# Patient Record
Sex: MALE | Race: WHITE
[De-identification: names, ages, dates, MRNs, and addresses within clinical notes are randomized per-mention and may not be internally consistent; named-entity substitution may affect disease eponyms.]

---

## 2019-07-14 ENCOUNTER — HOSPITAL ENCOUNTER (EMERGENCY)
Dept: HOSPITAL 95 - ER | Age: 84
Discharge: HOME | End: 2019-07-14
Payer: MEDICARE

## 2019-07-14 VITALS — BODY MASS INDEX: 33.9 KG/M2 | WEIGHT: 216.01 LBS | HEIGHT: 67 IN

## 2019-07-14 DIAGNOSIS — Z88.5: ICD-10-CM

## 2019-07-14 DIAGNOSIS — K21.9: ICD-10-CM

## 2019-07-14 DIAGNOSIS — Z79.899: ICD-10-CM

## 2019-07-14 DIAGNOSIS — Z88.8: ICD-10-CM

## 2019-07-14 DIAGNOSIS — R05: ICD-10-CM

## 2019-07-14 DIAGNOSIS — Z86.73: ICD-10-CM

## 2019-07-14 DIAGNOSIS — I25.2: ICD-10-CM

## 2019-07-14 DIAGNOSIS — I25.10: ICD-10-CM

## 2019-07-14 DIAGNOSIS — E78.5: ICD-10-CM

## 2019-07-14 DIAGNOSIS — I12.9: ICD-10-CM

## 2019-07-14 DIAGNOSIS — N18.9: ICD-10-CM

## 2019-07-14 DIAGNOSIS — R06.2: Primary | ICD-10-CM

## 2022-07-29 ENCOUNTER — HOSPITAL ENCOUNTER (OUTPATIENT)
Dept: HOSPITAL 95 - ER | Age: 87
Setting detail: OBSERVATION
LOS: 4 days | Discharge: HOME HEALTH | End: 2022-08-02
Attending: INTERNAL MEDICINE | Admitting: INTERNAL MEDICINE
Payer: MEDICARE

## 2022-07-29 VITALS — HEIGHT: 69 IN | BODY MASS INDEX: 31.62 KG/M2 | WEIGHT: 213.5 LBS

## 2022-07-29 DIAGNOSIS — G47.33: ICD-10-CM

## 2022-07-29 DIAGNOSIS — Z74.09: ICD-10-CM

## 2022-07-29 DIAGNOSIS — I12.9: ICD-10-CM

## 2022-07-29 DIAGNOSIS — I25.2: ICD-10-CM

## 2022-07-29 DIAGNOSIS — Z96.641: ICD-10-CM

## 2022-07-29 DIAGNOSIS — J12.82: ICD-10-CM

## 2022-07-29 DIAGNOSIS — K21.9: ICD-10-CM

## 2022-07-29 DIAGNOSIS — N18.9: ICD-10-CM

## 2022-07-29 DIAGNOSIS — U07.1: Primary | ICD-10-CM

## 2022-07-29 DIAGNOSIS — Z88.5: ICD-10-CM

## 2022-07-29 DIAGNOSIS — I25.10: ICD-10-CM

## 2022-07-29 DIAGNOSIS — E78.5: ICD-10-CM

## 2022-07-29 LAB
ALBUMIN SERPL BCP-MCNC: 3.2 G/DL (ref 3.4–5)
ALBUMIN/GLOB SERPL: 0.9 {RATIO} (ref 0.8–1.8)
ALT SERPL W P-5'-P-CCNC: 56 U/L (ref 12–78)
ANION GAP SERPL CALCULATED.4IONS-SCNC: 5 MMOL/L (ref 6–16)
AST SERPL W P-5'-P-CCNC: 38 U/L (ref 12–37)
BASOPHILS # BLD AUTO: 0.03 K/MM3 (ref 0–0.23)
BASOPHILS NFR BLD AUTO: 0 % (ref 0–2)
BILIRUB SERPL-MCNC: 0.4 MG/DL (ref 0.1–1)
BUN SERPL-MCNC: 14 MG/DL (ref 8–24)
CALCIUM SERPL-MCNC: 8.9 MG/DL (ref 8.5–10.1)
CHLORIDE SERPL-SCNC: 106 MMOL/L (ref 98–108)
CO2 SERPL-SCNC: 28 MMOL/L (ref 21–32)
CREAT SERPL-MCNC: 0.93 MG/DL (ref 0.6–1.2)
DEPRECATED RDW RBC AUTO: 50.8 FL (ref 35.1–46.3)
EOSINOPHIL # BLD AUTO: 0.02 K/MM3 (ref 0–0.68)
EOSINOPHIL NFR BLD AUTO: 0 % (ref 0–6)
ERYTHROCYTE [DISTWIDTH] IN BLOOD BY AUTOMATED COUNT: 15.5 % (ref 11.7–14.2)
GLOBULIN SER CALC-MCNC: 3.5 G/DL (ref 2.2–4)
GLUCOSE SERPL-MCNC: 109 MG/DL (ref 70–99)
HCT VFR BLD AUTO: 53 % (ref 37–53)
HGB BLD-MCNC: 17.5 G/DL (ref 13.5–17.5)
IMM GRANULOCYTES # BLD AUTO: 0.01 K/MM3 (ref 0–0.1)
IMM GRANULOCYTES NFR BLD AUTO: 0 % (ref 0–1)
LYMPHOCYTES # BLD AUTO: 1.72 K/MM3 (ref 0.84–5.2)
LYMPHOCYTES NFR BLD AUTO: 25 % (ref 21–46)
MAGNESIUM SERPL-MCNC: 2 MG/DL (ref 1.6–2.4)
MCHC RBC AUTO-ENTMCNC: 33 G/DL (ref 31.5–36.5)
MCV RBC AUTO: 89 FL (ref 80–100)
MONOCYTES # BLD AUTO: 1.01 K/MM3 (ref 0.16–1.47)
MONOCYTES NFR BLD AUTO: 15 % (ref 4–13)
NEUTROPHILS # BLD AUTO: 4.07 K/MM3 (ref 1.96–9.15)
NEUTROPHILS NFR BLD AUTO: 59 % (ref 41–73)
NRBC # BLD AUTO: 0 K/MM3 (ref 0–0.02)
NRBC BLD AUTO-RTO: 0 /100 WBC (ref 0–0.2)
PLATELET # BLD AUTO: 141 K/MM3 (ref 150–400)
POTASSIUM SERPL-SCNC: 3.7 MMOL/L (ref 3.5–5.5)
PROT SERPL-MCNC: 6.7 G/DL (ref 6.4–8.2)
SODIUM SERPL-SCNC: 139 MMOL/L (ref 136–145)

## 2022-07-29 PROCEDURE — A9270 NON-COVERED ITEM OR SERVICE: HCPCS

## 2022-07-30 NOTE — NUR
SHIFT SUMMARY;  PATIENT CAME TO MED FLOOR SHORTLY AFTER LUNCH.  HE IS AO X 4
ON ARRIVAL. HAS PLEASANT AFFECT. LIKES TO BE CALLED [facility].  PATIENT IS UNABLE
TO WALK BECAUSE HE NEEDS A LEFT HIP REPLACEMENT.  HE HAS HAD A LEFT KNEE
REPLACEMENT ALL READY.  HIS LUNGS ARE DIM THROUGHOUT HOWEVER THIS EVENING NO
AUDIBLE WHEEZES ARE NOTED.  HE REMAINS ON ROOM AIR.  HE USES CALL LIGHT
OCCASIONALLY, BUT NEEDS TO BE REMINDED TO USE CALL LIGHT TO MAKE HIS NEEDS
KNOWN AS HE IS IN AN ISOLATION ROOM FOR COVID. PATIENT VERBALIZED
UNDERSTANDING.  HE TAKES HIS MEDS WHOLE WITH H20.  HE USES A CPAP AT HOME
OCCASIONALLY BUT SAYS HE WILL ONLY WHERE IT HERE IF HE FEELS HE NEEDS IT.
PATIENT WAS ADMITTED BY DR. MONTAGUE AS HIS SPOUSE IS UNABLE TO TAKE CARE OF HIM
AT HOME SINCE SHE ALSO HAS COVID 19.  PATIENT WOULD LIKE PLACEMENT UNTIL HE
CAN RETURN HOME WITH HER CARE WHEN SHE FEELS BETTER.
 
HE IS ON A CARDIAC DIET.  WORKED WITH PT TODAY AND PER PT MADE GOOD PROGRESS.
 
WILL REMAIN AVAILABLE FOR THIS PATIENT FOR ANY WANTS OR NEEDS THAT COME UP
PRIOR TO SHIFT CHANGE AT [facility] SHIFT.

## 2022-07-31 LAB
ANION GAP SERPL CALCULATED.4IONS-SCNC: 9 MMOL/L (ref 6–16)
BASOPHILS # BLD AUTO: 0.02 K/MM3 (ref 0–0.23)
BASOPHILS NFR BLD AUTO: 0 % (ref 0–2)
BUN SERPL-MCNC: 17 MG/DL (ref 8–24)
CALCIUM SERPL-MCNC: 8.5 MG/DL (ref 8.5–10.1)
CHLORIDE SERPL-SCNC: 107 MMOL/L (ref 98–108)
CO2 SERPL-SCNC: 26 MMOL/L (ref 21–32)
CREAT SERPL-MCNC: 0.96 MG/DL (ref 0.6–1.2)
DEPRECATED RDW RBC AUTO: 50.1 FL (ref 35.1–46.3)
EOSINOPHIL # BLD AUTO: 0.2 K/MM3 (ref 0–0.68)
EOSINOPHIL NFR BLD AUTO: 4 % (ref 0–6)
ERYTHROCYTE [DISTWIDTH] IN BLOOD BY AUTOMATED COUNT: 15.3 % (ref 11.7–14.2)
GLUCOSE SERPL-MCNC: 107 MG/DL (ref 70–99)
HCT VFR BLD AUTO: 50 % (ref 37–53)
HGB BLD-MCNC: 16.5 G/DL (ref 13.5–17.5)
IMM GRANULOCYTES # BLD AUTO: 0 K/MM3 (ref 0–0.1)
IMM GRANULOCYTES NFR BLD AUTO: 0 % (ref 0–1)
LYMPHOCYTES # BLD AUTO: 1.63 K/MM3 (ref 0.84–5.2)
LYMPHOCYTES NFR BLD AUTO: 30 % (ref 21–46)
MCHC RBC AUTO-ENTMCNC: 33 G/DL (ref 31.5–36.5)
MCV RBC AUTO: 89 FL (ref 80–100)
MONOCYTES # BLD AUTO: 0.56 K/MM3 (ref 0.16–1.47)
MONOCYTES NFR BLD AUTO: 10 % (ref 4–13)
NEUTROPHILS # BLD AUTO: 2.97 K/MM3 (ref 1.96–9.15)
NEUTROPHILS NFR BLD AUTO: 55 % (ref 41–73)
NRBC # BLD AUTO: 0 K/MM3 (ref 0–0.02)
NRBC BLD AUTO-RTO: 0 /100 WBC (ref 0–0.2)
PLATELET # BLD AUTO: 144 K/MM3 (ref 150–400)
POTASSIUM SERPL-SCNC: 3.6 MMOL/L (ref 3.5–5.5)
SODIUM SERPL-SCNC: 142 MMOL/L (ref 136–145)

## 2022-07-31 NOTE — NUR
SHIFT SUMMARY;  PATIENT WAS UP TO BEDSIDE CHAIAR AND COMMODE TODAY USING FRONT
WHEEL WALKER.  HE IS NEEDING ENCOURAGEMENT, BUT IS ABLE TO ACCOMPLISH WITH A
STAND BY ASSIST.   PATIENT ASKS FOR PAIN MEDICATION X 1 TODAY.  
CHANGES PAIN MEDICATIONS TO BID ULTRAM 50MG AND THEN I PRN ULTRAM IF NEEDED.
 
PATIENT IS AO X 4 TODAY.  NO FAMILY VISITS.  HE REMAINS IN COVID PRECAUTIONS
AND ISOLATION.  HE IS ON ROOM AIR. VITAL SIGNS ARE SLIGHTLY ELEVATED BLOOD
PRESSURE.  HE IS IN SINUS RHYTHM.  SLIGHT EDEMA TO BILATERAL LOWER
EXTREMITIES.
 
WILL REMAIN AVAILABLE FOR THIS PATIENT FOR ANY WANTS OR NEEDS UNTIL HAND OFF
AT SHIFT CHANGE TONIGHT.

## 2022-07-31 NOTE — NUR
Patient awake most of night.  a&ox4, pleasant and cooperative with care.
C/O bilateral hip pain L>R.  Medicated with Tylenol, then shortly after with
Ultram.  Patient states it did help, and that he is on the September
schedule for a left hip replacement.  He states he is so painful, he is having
much difficulty ambulating at all.

## 2022-08-01 NOTE — NUR
PATIENT IS ANXIOUS TO GO HOME. HE WANTS  TO CALL HIS WIFE TO
DISCUSS [facility]. PT/OT WORKED WITH PATIENT TODAY FOR ABOUT
A HALF AN HOUR AND HE DID GREAT. PATIENTS SA02 IS NORMAL. HE HAS BEEN ON MAX
OF 1 LITER TODAY. HE HAS A FEW CRACKLES IN LUNGS AND TRACE EDEMA IN LE. FEET
ARE PURPLE. HEART SOUNDS IRREGULAR. WEAKNESS IS IMPROVING AND PLACEMENT WAS
ACTUALLY RECOMMENDED AS SNF, BUT THE PATIENT WANTS HOME HEALTH, SO THIS
OPTIONS IS BEING WORKED OUT.

## 2022-08-02 LAB
ANION GAP SERPL CALCULATED.4IONS-SCNC: 6 MMOL/L (ref 6–16)
BASOPHILS # BLD AUTO: 0.03 K/MM3 (ref 0–0.23)
BASOPHILS NFR BLD AUTO: 1 % (ref 0–2)
BUN SERPL-MCNC: 21 MG/DL (ref 8–24)
CALCIUM SERPL-MCNC: 8.5 MG/DL (ref 8.5–10.1)
CHLORIDE SERPL-SCNC: 106 MMOL/L (ref 98–108)
CO2 SERPL-SCNC: 30 MMOL/L (ref 21–32)
CREAT SERPL-MCNC: 1.06 MG/DL (ref 0.6–1.2)
DEPRECATED RDW RBC AUTO: 49.7 FL (ref 35.1–46.3)
EOSINOPHIL # BLD AUTO: 0.18 K/MM3 (ref 0–0.68)
EOSINOPHIL NFR BLD AUTO: 3 % (ref 0–6)
ERYTHROCYTE [DISTWIDTH] IN BLOOD BY AUTOMATED COUNT: 14.8 % (ref 11.7–14.2)
GLUCOSE SERPL-MCNC: 103 MG/DL (ref 70–99)
HCT VFR BLD AUTO: 49.8 % (ref 37–53)
HGB BLD-MCNC: 16 G/DL (ref 13.5–17.5)
IMM GRANULOCYTES # BLD AUTO: 0.01 K/MM3 (ref 0–0.1)
IMM GRANULOCYTES NFR BLD AUTO: 0 % (ref 0–1)
LYMPHOCYTES # BLD AUTO: 2.15 K/MM3 (ref 0.84–5.2)
LYMPHOCYTES NFR BLD AUTO: 33 % (ref 21–46)
MCHC RBC AUTO-ENTMCNC: 32.1 G/DL (ref 31.5–36.5)
MCV RBC AUTO: 90 FL (ref 80–100)
MONOCYTES # BLD AUTO: 0.66 K/MM3 (ref 0.16–1.47)
MONOCYTES NFR BLD AUTO: 10 % (ref 4–13)
NEUTROPHILS # BLD AUTO: 3.54 K/MM3 (ref 1.96–9.15)
NEUTROPHILS NFR BLD AUTO: 54 % (ref 41–73)
NRBC # BLD AUTO: 0 K/MM3 (ref 0–0.02)
NRBC BLD AUTO-RTO: 0 /100 WBC (ref 0–0.2)
PLATELET # BLD AUTO: 170 K/MM3 (ref 150–400)
POTASSIUM SERPL-SCNC: 4.1 MMOL/L (ref 3.5–5.5)
SODIUM SERPL-SCNC: 142 MMOL/L (ref 136–145)

## 2022-08-02 NOTE — NUR
DISCHARGE-1600
PATIENT WAS DISCHARGED TO HOME WITH HOME HEALTH AT 1600 TODAY.
DISCHARGE PAPERWORK REVIEWED, INSTRUCTIONS EXPLAINED AND A SCRIPT GIVEN TO
PATIENT TO DROP OFF- THE REST OF THE SCRIPTS WERE FAXED TO HIS PHARMACY.
IV WAS DISCONTINUED AND THE PATIENT RECEIVED EDUCATION ON NEW MEDICATION AND
COVID PRECAUTIONS.